# Patient Record
Sex: MALE | Race: WHITE | HISPANIC OR LATINO | Employment: UNEMPLOYED | URBAN - METROPOLITAN AREA
[De-identification: names, ages, dates, MRNs, and addresses within clinical notes are randomized per-mention and may not be internally consistent; named-entity substitution may affect disease eponyms.]

---

## 2020-01-07 ENCOUNTER — HOSPITAL ENCOUNTER (EMERGENCY)
Facility: MEDICAL CENTER | Age: 55
End: 2020-01-07
Attending: EMERGENCY MEDICINE
Payer: OTHER MISCELLANEOUS

## 2020-01-07 ENCOUNTER — APPOINTMENT (OUTPATIENT)
Dept: RADIOLOGY | Facility: MEDICAL CENTER | Age: 55
End: 2020-01-07
Attending: STUDENT IN AN ORGANIZED HEALTH CARE EDUCATION/TRAINING PROGRAM
Payer: OTHER MISCELLANEOUS

## 2020-01-07 ENCOUNTER — APPOINTMENT (OUTPATIENT)
Dept: RADIOLOGY | Facility: MEDICAL CENTER | Age: 55
End: 2020-01-07
Attending: EMERGENCY MEDICINE
Payer: OTHER MISCELLANEOUS

## 2020-01-07 VITALS
OXYGEN SATURATION: 98 % | BODY MASS INDEX: 32.43 KG/M2 | HEART RATE: 82 BPM | HEIGHT: 65 IN | WEIGHT: 194.67 LBS | RESPIRATION RATE: 16 BRPM | DIASTOLIC BLOOD PRESSURE: 65 MMHG | TEMPERATURE: 97.5 F | SYSTOLIC BLOOD PRESSURE: 121 MMHG

## 2020-01-07 DIAGNOSIS — M54.50 LUMBAR BACK PAIN: ICD-10-CM

## 2020-01-07 DIAGNOSIS — M54.16 LUMBAR RADICULOPATHY: ICD-10-CM

## 2020-01-07 LAB
ALBUMIN SERPL BCP-MCNC: 4.2 G/DL (ref 3.2–4.9)
ALBUMIN/GLOB SERPL: 1.3 G/DL
ALP SERPL-CCNC: 71 U/L (ref 30–99)
ALT SERPL-CCNC: 17 U/L (ref 2–50)
ANION GAP SERPL CALC-SCNC: 11 MMOL/L (ref 0–11.9)
AST SERPL-CCNC: 16 U/L (ref 12–45)
BASOPHILS # BLD AUTO: 0.4 % (ref 0–1.8)
BASOPHILS # BLD: 0.03 K/UL (ref 0–0.12)
BILIRUB SERPL-MCNC: 0.9 MG/DL (ref 0.1–1.5)
BUN SERPL-MCNC: 40 MG/DL (ref 8–22)
CALCIUM SERPL-MCNC: 9.2 MG/DL (ref 8.5–10.5)
CHLORIDE SERPL-SCNC: 106 MMOL/L (ref 96–112)
CO2 SERPL-SCNC: 22 MMOL/L (ref 20–33)
CREAT SERPL-MCNC: 1.09 MG/DL (ref 0.5–1.4)
EOSINOPHIL # BLD AUTO: 0.08 K/UL (ref 0–0.51)
EOSINOPHIL NFR BLD: 1 % (ref 0–6.9)
ERYTHROCYTE [DISTWIDTH] IN BLOOD BY AUTOMATED COUNT: 45.9 FL (ref 35.9–50)
GLOBULIN SER CALC-MCNC: 3.3 G/DL (ref 1.9–3.5)
GLUCOSE SERPL-MCNC: 135 MG/DL (ref 65–99)
HCT VFR BLD AUTO: 40.1 % (ref 42–52)
HGB BLD-MCNC: 13.4 G/DL (ref 14–18)
IMM GRANULOCYTES # BLD AUTO: 0.02 K/UL (ref 0–0.11)
IMM GRANULOCYTES NFR BLD AUTO: 0.3 % (ref 0–0.9)
LYMPHOCYTES # BLD AUTO: 1.49 K/UL (ref 1–4.8)
LYMPHOCYTES NFR BLD: 19.4 % (ref 22–41)
MCH RBC QN AUTO: 32.1 PG (ref 27–33)
MCHC RBC AUTO-ENTMCNC: 33.4 G/DL (ref 33.7–35.3)
MCV RBC AUTO: 96.2 FL (ref 81.4–97.8)
MONOCYTES # BLD AUTO: 0.62 K/UL (ref 0–0.85)
MONOCYTES NFR BLD AUTO: 8.1 % (ref 0–13.4)
NEUTROPHILS # BLD AUTO: 5.44 K/UL (ref 1.82–7.42)
NEUTROPHILS NFR BLD: 70.8 % (ref 44–72)
NRBC # BLD AUTO: 0 K/UL
NRBC BLD-RTO: 0 /100 WBC
PLATELET # BLD AUTO: 184 K/UL (ref 164–446)
PMV BLD AUTO: 11.4 FL (ref 9–12.9)
POTASSIUM SERPL-SCNC: 3.9 MMOL/L (ref 3.6–5.5)
PROT SERPL-MCNC: 7.5 G/DL (ref 6–8.2)
RBC # BLD AUTO: 4.17 M/UL (ref 4.7–6.1)
SODIUM SERPL-SCNC: 139 MMOL/L (ref 135–145)
WBC # BLD AUTO: 7.7 K/UL (ref 4.8–10.8)

## 2020-01-07 PROCEDURE — A9576 INJ PROHANCE MULTIPACK: HCPCS | Performed by: EMERGENCY MEDICINE

## 2020-01-07 PROCEDURE — 700111 HCHG RX REV CODE 636 W/ 250 OVERRIDE (IP): Performed by: EMERGENCY MEDICINE

## 2020-01-07 PROCEDURE — 85025 COMPLETE CBC W/AUTO DIFF WBC: CPT

## 2020-01-07 PROCEDURE — 700117 HCHG RX CONTRAST REV CODE 255: Performed by: EMERGENCY MEDICINE

## 2020-01-07 PROCEDURE — 99285 EMERGENCY DEPT VISIT HI MDM: CPT

## 2020-01-07 PROCEDURE — 72158 MRI LUMBAR SPINE W/O & W/DYE: CPT

## 2020-01-07 PROCEDURE — 80053 COMPREHEN METABOLIC PANEL: CPT

## 2020-01-07 PROCEDURE — 700102 HCHG RX REV CODE 250 W/ 637 OVERRIDE(OP): Performed by: EMERGENCY MEDICINE

## 2020-01-07 PROCEDURE — 96374 THER/PROPH/DIAG INJ IV PUSH: CPT

## 2020-01-07 PROCEDURE — A9270 NON-COVERED ITEM OR SERVICE: HCPCS | Performed by: EMERGENCY MEDICINE

## 2020-01-07 RX ORDER — PREGABALIN 25 MG/1
25 CAPSULE ORAL 3 TIMES DAILY
COMMUNITY
End: 2020-01-07 | Stop reason: SDUPTHER

## 2020-01-07 RX ORDER — HYDROCODONE BITARTRATE AND ACETAMINOPHEN 5; 325 MG/1; MG/1
1-2 TABLET ORAL EVERY 4 HOURS PRN
COMMUNITY
End: 2020-01-09

## 2020-01-07 RX ORDER — KETOROLAC TROMETHAMINE 30 MG/ML
15 INJECTION, SOLUTION INTRAMUSCULAR; INTRAVENOUS ONCE
Status: COMPLETED | OUTPATIENT
Start: 2020-01-07 | End: 2020-01-07

## 2020-01-07 RX ORDER — HYDROCODONE BITARTRATE AND ACETAMINOPHEN 5; 325 MG/1; MG/1
1 TABLET ORAL ONCE
Status: COMPLETED | OUTPATIENT
Start: 2020-01-07 | End: 2020-01-07

## 2020-01-07 RX ORDER — PREGABALIN 25 MG/1
25 CAPSULE ORAL 3 TIMES DAILY
Qty: 45 CAP | Refills: 0 | Status: SHIPPED | OUTPATIENT
Start: 2020-01-07 | End: 2020-01-22

## 2020-01-07 RX ORDER — FAMOTIDINE 20 MG/1
20 TABLET, FILM COATED ORAL 2 TIMES DAILY
COMMUNITY

## 2020-01-07 RX ADMIN — GADOTERIDOL 20 ML: 279.3 INJECTION, SOLUTION INTRAVENOUS at 09:43

## 2020-01-07 RX ADMIN — HYDROCODONE BITARTRATE AND ACETAMINOPHEN 1 TABLET: 5; 325 TABLET ORAL at 08:12

## 2020-01-07 RX ADMIN — KETOROLAC TROMETHAMINE 15 MG: 30 INJECTION, SOLUTION INTRAMUSCULAR at 09:59

## 2020-01-07 SDOH — HEALTH STABILITY: MENTAL HEALTH: HOW OFTEN DO YOU HAVE A DRINK CONTAINING ALCOHOL?: NEVER

## 2020-01-07 ASSESSMENT — LIFESTYLE VARIABLES
DOES PATIENT WANT TO STOP DRINKING: NO
DO YOU DRINK ALCOHOL: NO
HAVE YOU EVER FELT YOU SHOULD CUT DOWN ON YOUR DRINKING: NO
TOTAL SCORE: 0
EVER HAD A DRINK FIRST THING IN THE MORNING TO STEADY YOUR NERVES TO GET RID OF A HANGOVER: NO
EVER FELT BAD OR GUILTY ABOUT YOUR DRINKING: NO
CONSUMPTION TOTAL: INCOMPLETE
TOTAL SCORE: 0
HAVE PEOPLE ANNOYED YOU BY CRITICIZING YOUR DRINKING: NO
TOTAL SCORE: 0

## 2020-01-07 ASSESSMENT — PAIN DESCRIPTION - DESCRIPTORS: DESCRIPTORS: DULL

## 2020-01-07 NOTE — ED NOTES
Report received, assuming care.  Pt medicated as ordered for 5/10 back pain.  Awaiting to go to MRI.  MRI screening completed using the  ipad.

## 2020-01-07 NOTE — ED NOTES
Assessment includes a neuro assessment as WDL, abnormal statements made in waiting room per ABBI Perez technician, no abnormal statements made to myself, ALY Munoz, at this time including assessment.

## 2020-01-07 NOTE — ED NOTES
Pt up for discharge.  Instructions given via  ipad.  Pt verbalized understanding, signed.  Requesting a cab voucher.  Bus pass provided.  Pt leaves ambulatory, steady gait, no distress.

## 2020-01-07 NOTE — ED TRIAGE NOTES
Miguel Cain  54 y.o.  male  Chief Complaint   Patient presents with   • Low Back Pain     brought in by tevin picked up from [facility] 6. c/o low back pain ( chronic )  radiates to both legs x years. patient Romansh speaking only.

## 2020-01-07 NOTE — ED PROVIDER NOTES
ED Provider Note       service used to assist with this encounter      ER PROVIDER NOTE      CHIEF COMPLAINT  Chief Complaint   Patient presents with   • Low Back Pain     brought in by remsa picked up from [facility] 6. c/o low back pain ( chronic )  radiates to both legs x years. patient English speaking only.        HPI  Miguel Cain is a 54 y.o. male with past medical history of untreated diabetes, high blood pressure who presents to the emergency department complaining of worsening low back pain and leg weakness.  The pain is in the lumbar area and radiates into the inguinal region and down the legs.  Patient states that he has a long history of back pain with associated numbness in the legs for years, that he has had 3 surgeries in the back, but yesterday when he was trying to run, he felt acute worsening of his low back pain.  Patient reports that he has difficulty with balance and frequently falls because of his leg numbness and weakness.  He reports that he is diabetic but has been off of his insulin for the last 4 years because his medications were left in [place] and he since moved to [place] and then came to [place].  He has been in [place] for the last 2 days.  Patient denies numbness in the groin, loss of bowel or bladder function.  He denies chest pain, abdominal pain, headaches, fevers.          REVIEW OF SYSTEMS  Pertinent positives include numbness and weakness of bilateral legs. Pertinent negatives include no numbness in the groin, loss of bowel or bladder function. See HPI for details. All other systems reviewed and are negative.    PAST MEDICAL HISTORY   has a past medical history of Chronic back pain, HTN (hypertension), Neuropathy (HCC), and Peptic ulcer.   Diabetes    SURGICAL HISTORY  3 spinal surgeries      FAMILY HISTORY  History reviewed. No pertinent family history.    SOCIAL HISTORY  Patient is an every day smoker  He denies alcohol use  He denies substance use     Social History  "    Substance and Sexual Activity   Drug Use Never       CURRENT MEDICATIONS  Home Medications     Reviewed by Blake Gao R.N. (Registered Nurse) on 01/07/20 at 0237  Med List Status: Complete   Medication Last Dose Status   famotidine (PEPCID) 20 MG Tab 1/6/2020 Active   HYDROcodone-acetaminophen (NORCO) 5-325 MG Tab per tablet 1/6/2020 Active   pregabalin (LYRICA) 25 MG Cap  Active                ALLERGIES  Allergies   Allergen Reactions   • Ibuprofen    • Tylenol        PHYSICAL EXAM  VITAL SIGNS: /84   Pulse 100   Temp 36.4 °C (97.5 °F) (Oral)   Resp 16   Ht 1.651 m (5' 5\")   Wt 88.3 kg (194 lb 10.7 oz)   SpO2 98%   BMI 32.39 kg/m²   Pulse ox interpretation: I interpret this pulse ox as normal.    Constitutional: Alert in no apparent distress.  HENT: No signs of trauma, Bilateral external ears normal, Nose normal.   Eyes: Pupils are equal and reactive, Conjunctiva normal, Non-icteric.   Neck: Normal range of motion, No tenderness, Supple, No stridor.   Lymphatic: No lymphadenopathy noted.   Cardiovascular: Regular rate and rhythm, no murmurs.   Thorax & Lungs: Normal breath sounds, No respiratory distress, No wheezing, No chest tenderness.   Abdomen: Bowel sounds normal, Soft, No tenderness, No masses, No pulsatile masses. No peritoneal signs.  Skin: Warm, Dry, No erythema, No rash.   Back: Tenderness over the lower lumbar vertebra, bilateral hips.  Paravertebral tenderness also present. No bony tenderness in the thoracic or cervical spine.  Negative straight leg raise.  Extremities: Intact distal pulses, No edema, No tenderness, No cyanosis,  Neurologic: Alert, strength full and symmetric in the upper extremities, 4 out of 5 strength in bilateral lower extremities with reported decreased sensation otherwise no focal deficits noted  Psychiatric: Affect normal, Mood normal.     DIAGNOSTIC STUDIES / PROCEDURES      LABS  Results for orders placed or performed during the hospital encounter of " 01/07/20   CBC WITH DIFFERENTIAL   Result Value Ref Range    WBC 7.7 4.8 - 10.8 K/uL    RBC 4.17 (L) 4.70 - 6.10 M/uL    Hemoglobin 13.4 (L) 14.0 - 18.0 g/dL    Hematocrit 40.1 (L) 42.0 - 52.0 %    MCV 96.2 81.4 - 97.8 fL    MCH 32.1 27.0 - 33.0 pg    MCHC 33.4 (L) 33.7 - 35.3 g/dL    RDW 45.9 35.9 - 50.0 fL    Platelet Count 184 164 - 446 K/uL    MPV 11.4 9.0 - 12.9 fL    Neutrophils-Polys 70.80 44.00 - 72.00 %    Lymphocytes 19.40 (L) 22.00 - 41.00 %    Monocytes 8.10 0.00 - 13.40 %    Eosinophils 1.00 0.00 - 6.90 %    Basophils 0.40 0.00 - 1.80 %    Immature Granulocytes 0.30 0.00 - 0.90 %    Nucleated RBC 0.00 /100 WBC    Neutrophils (Absolute) 5.44 1.82 - 7.42 K/uL    Lymphs (Absolute) 1.49 1.00 - 4.80 K/uL    Monos (Absolute) 0.62 0.00 - 0.85 K/uL    Eos (Absolute) 0.08 0.00 - 0.51 K/uL    Baso (Absolute) 0.03 0.00 - 0.12 K/uL    Immature Granulocytes (abs) 0.02 0.00 - 0.11 K/uL    NRBC (Absolute) 0.00 K/uL   COMP METABOLIC PANEL   Result Value Ref Range    Sodium 139 135 - 145 mmol/L    Potassium 3.9 3.6 - 5.5 mmol/L    Chloride 106 96 - 112 mmol/L    Co2 22 20 - 33 mmol/L    Anion Gap 11.0 0.0 - 11.9    Glucose 135 (H) 65 - 99 mg/dL    Bun 40 (H) 8 - 22 mg/dL    Creatinine 1.09 0.50 - 1.40 mg/dL    Calcium 9.2 8.5 - 10.5 mg/dL    AST(SGOT) 16 12 - 45 U/L    ALT(SGPT) 17 2 - 50 U/L    Alkaline Phosphatase 71 30 - 99 U/L    Total Bilirubin 0.9 0.1 - 1.5 mg/dL    Albumin 4.2 3.2 - 4.9 g/dL    Total Protein 7.5 6.0 - 8.2 g/dL    Globulin 3.3 1.9 - 3.5 g/dL    A-G Ratio 1.3 g/dL   ESTIMATED GFR   Result Value Ref Range    GFR If African American >60 >60 mL/min/1.73 m 2    GFR If Non African American >60 >60 mL/min/1.73 m 2       All labs reviewed by me.    RADIOLOGY  MR-LUMBAR SPINE-WITH & W/O   Final Result      1.  Transitional lumbosacral anatomy as detailed above.   2.  Postsurgical changes L5-S1 posterior spinal fusion laminectomies.   3.  Mild multilevel disc and facet degeneration as detailed. L2-L3 mild  spinal stenosis.   4.  Foraminal narrowing as detailed, at least moderate left L4-L5.           The radiologist's interpretation of all radiological studies have been reviewed by me.    COURSE & MEDICAL DECISION MAKING  Nursing notes, VS, PMSFHx reviewed in chart.    6:07 AM Patient seen and examined at bedside. Patient will be treated with Norco 5/325. Ordered for MRI lumbar spine, CBC, CMP to evaluate his symptoms.     10:35 AM reviewed MRI results.  Discussed results with patient and informed him that there are no acute changes on his MRI.  His symptoms are somewhat improved by the pain medication.  Discussed that he should see his doctor upon returning to [place].  Patient is requesting a supply of Lyrica since he left his prescriptions in [place].    Decision Making:  This is a 54 y.o. male with history of untreated diabetes presenting with acute on chronic low back pain with radiculopathy.  He has bony tenderness underneath his surgical scar in the lumbar area.  He reports leg weakness, numbness, frequent falls.  He has weakness on exam in the lower legs.  He is afebrile and has no history of malignancy.  Differential diagnosis includes but is not limited to cauda equina syndrome, spinal stenosis, lumbago with radiculopathy, displaced surgical hardware.  His MRI is negative for acute fracture, significant spinal cord compression or other acute findings.  Patient is requesting a supply of Lyrica since he has left his prescription in [place].  Discussed with patient that I do not feel his symptoms will best be managed by a narcotic pain medication, but I will agree to giving him a temporary supply of Lyrica until he can see his physician.  Therefore the patient will be discharged in a stable condition with instructions to follow-up with his primary care physician.    The patient will not drink alcohol nor drive with prescribed medications. The patient will return for new or worsening symptoms and is  stable at the time of discharge. Patient was given return precautions. Patient and/or family member verbalizes understanding and will comply.    DISPOSITION:  Patient will be discharged home in stable condition.    FOLLOW UP:  No follow-up provider specified.   Your doctor    OUTPATIENT MEDICATIONS:  Current Outpatient Medications   Medication Sig Dispense Refill   • HYDROcodone-acetaminophen (NORCO) 5-325 MG Tab per tablet Take 1-2 Tabs by mouth every four hours as needed.     • famotidine (PEPCID) 20 MG Tab Take 20 mg by mouth 2 times a day.     • pregabalin (LYRICA) 25 MG Cap Take 1 Cap by mouth 3 times a day for 15 days. 45 Cap 0           FINAL IMPRESSION  1. Lumbar back pain    2. Lumbar radiculopathy

## 2020-01-09 ENCOUNTER — HOSPITAL ENCOUNTER (EMERGENCY)
Facility: MEDICAL CENTER | Age: 55
End: 2020-01-09
Attending: EMERGENCY MEDICINE
Payer: MEDICARE

## 2020-01-09 ENCOUNTER — APPOINTMENT (OUTPATIENT)
Dept: RADIOLOGY | Facility: MEDICAL CENTER | Age: 55
End: 2020-01-09
Attending: EMERGENCY MEDICINE
Payer: MEDICARE

## 2020-01-09 VITALS
HEIGHT: 65 IN | DIASTOLIC BLOOD PRESSURE: 88 MMHG | HEART RATE: 86 BPM | BODY MASS INDEX: 31.4 KG/M2 | WEIGHT: 188.49 LBS | SYSTOLIC BLOOD PRESSURE: 132 MMHG | OXYGEN SATURATION: 96 % | RESPIRATION RATE: 16 BRPM | TEMPERATURE: 98.6 F

## 2020-01-09 DIAGNOSIS — M54.50 CHRONIC BILATERAL LOW BACK PAIN WITHOUT SCIATICA: ICD-10-CM

## 2020-01-09 DIAGNOSIS — G89.29 CHRONIC BILATERAL LOW BACK PAIN WITHOUT SCIATICA: ICD-10-CM

## 2020-01-09 DIAGNOSIS — R51.9 NONINTRACTABLE HEADACHE, UNSPECIFIED CHRONICITY PATTERN, UNSPECIFIED HEADACHE TYPE: ICD-10-CM

## 2020-01-09 PROCEDURE — 99284 EMERGENCY DEPT VISIT MOD MDM: CPT

## 2020-01-09 PROCEDURE — 700102 HCHG RX REV CODE 250 W/ 637 OVERRIDE(OP): Performed by: EMERGENCY MEDICINE

## 2020-01-09 PROCEDURE — A9270 NON-COVERED ITEM OR SERVICE: HCPCS | Performed by: EMERGENCY MEDICINE

## 2020-01-09 PROCEDURE — 70450 CT HEAD/BRAIN W/O DYE: CPT

## 2020-01-09 RX ORDER — HYDROCODONE BITARTRATE AND ACETAMINOPHEN 5; 325 MG/1; MG/1
1 TABLET ORAL EVERY 4 HOURS PRN
Qty: 15 TAB | Refills: 0 | Status: SHIPPED | OUTPATIENT
Start: 2020-01-09 | End: 2020-01-13

## 2020-01-09 RX ORDER — HYDROCODONE BITARTRATE AND ACETAMINOPHEN 5; 325 MG/1; MG/1
1 TABLET ORAL ONCE
Status: COMPLETED | OUTPATIENT
Start: 2020-01-09 | End: 2020-01-09

## 2020-01-09 RX ADMIN — HYDROCODONE BITARTRATE AND ACETAMINOPHEN 1 TABLET: 5; 325 TABLET ORAL at 09:22

## 2020-01-09 NOTE — ED NOTES
Pt. Discharged at this time,  # [id number] utilized, pt. Denies questions, pt. Provided with cab voucher and prescription for pain medication. Pt. Ambulated to ED entrance independently with a steady gait.

## 2020-01-09 NOTE — DISCHARGE INSTRUCTIONS
"Dolor de shaun, preguntas frecuentes y monica respuestas  (Headaches, Frequently Asked Questions)  CEFALEAS MIGRAÑOSAS  P: ¿Qué es la migraña? ¿Qué la ocasiona? ¿Cómo puedo tratarla?  R: En general, la migraña comienza duane un dolor apagado. Luego progresa hacia un dolor, sue, punzante y duane un latido. Sentirá monse dolor en las sienes. Podrá sentir dolor en la parte anterior o posterior de la shaun, o en beti o ambos lados. El dolor suele estar acompañado de silvino combinación de:  · Náuseas.   · Vómitos.   · Sensibilidad a la giovanni y los ruidos.   Algunas personas (un 15%) experimentan un aura (femi abajo) antes de un ataque. La causa de la migraña se debe a reacciones químicas del cerebro. El tratamiento para la migraña puede incluir medicamentos de venta ailin. También puede incluir técnicas de autoayuda. Estas incluyen entrenamientos para la relajación y biorretroalimentación.   P: ¿Qué es un aura?  R: Alrededor del 15% de las personas con migraña tiene un \"aura\". Es silvino señal de síntomas neurológicos que ocurren antes de un dolor de shaun por migrañas. Podrá femi líneas onduladas o irregulares, puntos o luces parpadeantes. Podrá experimentar visión de túnel o puntos ciegos en beti o ambos ojos. El aura puede incluir alucinaciones visuales o auditivas (algo que se imagina). Puede incluir trastornos en el olfato (duane olores extraños), el tacto o el gusto. Entre otros síntomas se incluyen:  · Adormecimiento.   · Sensación de hormigueo.   · Dificultad para recordar o decir la palabra correcta.   Estos episodios neurológicos pueden durar hasta 60 minutos. Los síntomas desaparecerán a medida que el dolor de shaun comience.  P:¿Qué es un disparador?  R: Ciertos factores físicos o ambientales pueden llevar a \"disparar\" silvino migraña. Estos son:  · Alimentos.   · Cambios hormonales.   · Clima.   · Estrés.   Es importante recordar que los disparadores son diferentes entre si. Para ayudar a prevenir ataques de migrañas, " "necesitará descubrir cuáles son los disparadores que le afectan. Lleve un diario sobre monica ike de shaun. Lesvia es un buen modo para descubrir los disparadores. El diario le ayudará en el momento de hablar con el profesional acerca de proctor enfermedad.  P: ¿El clima afecta en las migrañas?  R: La giovanni solar, el calor, la humedad y lo cambios drásticos en la presión barométrica pueden llevar a, o \"disparar\" un ataque de migraña en algunas personas. Mary estudios valladares demostrado que el clima no actúa duane disparador para todas las personas con migraña.  P: ¿Cuál es la relación entre la migraña y la hormonas?  R: Las hormonas inician y regulan muchas de las funciones corporales. Las hormonas mantienen el balance en el cuerpo dentro de los constantes cambios de ambiente. Algunas veces, el nivel de hormonas en el cuerpo se desbalancea. Por ejemplo, poncho la menstruación, el embarazo o la menopausia. Pueden ser la causa de un ataque de migraña. De hecho, alrededor de abdoul cuartos de las mujeres con migraña informan que monica ataques están relacionados con el ciclo menstrual.   P: ¿Aumenta el riesgo de sufrir un choque cardíaco en las personas que padecen migraña?  R: La probabilidad de que un ataque de migraña ocasione un ataque cardíaco es muy remota. [place] no quiere decir que silvino persona que sufre de migraña no pueda tener un ataque cardíaco asociado con roselyn. En las personas menores de 40 años, el factor más común para un ataque es la migraña. Mary poncho la brian de silvino persona, la ocurrencia de un dolor de shaun por migraña está asociada con silvino reducción en el riesgo de morir por un ataque cerebrovascular.   P: ¿Cuáles son los medicamentos para la migraña?  R: La medicación precisa se utiliza para tratar el dolor de shaun silvino vez que ha comenzado. Son ejemplos, medicamentos de venta ailin, desinflamatorios sin esteroides, ergotamínicos y triptanos.   P: ¿Qué son los triptanos?  R: Lo triptanos son silvino nueva clase de " "medicamentos abortivos. Son específicos para tratar monse problema. Los triptanos son vasoconstrictores. Moderan algunas reacciones químicas del cerebro. Los triptanos trabajan duane receptores del cerebro. Ayudan a restaurar el balance de un neurotransmisor denominado serotonina. Se erma que las fluctuaciones en los niveles de serotonina son la causa principal de la migraña.   P: ¿Son efectivos los medicamentos de venta ailin para la migraña?  R: Los medicamentos de venta ailin pueden ser efectivos para aliviar ike leves a moderados y los síntomas asociados a la migraña. Mary deberá consultar a un médico antes de comenzar cualquier tratamiento para la migraña.   P: ¿Cuáles son los medicamentos de prevención de la migraña?  R: Se suele denominar tratamiento \"profiláctico\" a los medicamentos para la prevención de la migraña. Se utilizan para reducir la frecuencia, gravedad y duración de los ataques de migraña. Son ejemplos de medicamentos de prevención: antiepilépticos, antidepresivos, bloqueadores beta, bloqueadores de los bowles de calcio y medicamentos antiinflamatorios sin esteroides.  P: ¿ Por qué se utilizan anticonvulsivantes para tratar la migraña?  R: Rhys los últimos años, ha habido un creciente interés en las drogas antiepilépticas para la prevención de la migraña. A menudo se los conoce duane \"anticonvulsivantes\". La epilepsia y la migraña suceden por reacciones similares en el cerebro.   P: ¿ Por qué se utilizan antidepresivos para tratar la migraña?  R: Los antidepresivos típicamente se utilizan para tratar a las personas con depresión. Pueden reducir la frecuencia de la migraña a través de la regulación de los niveles químicos, duane la serotonina, en el cerebro.   P: ¿ Por qué se utilizan terapias alternativas para tratar la migraña?  R: El término \"terapias alternativas\" suelen utilizarse para describir los tratamientos que se considera que están por fuera de alcance la medicina occidental " "convencional. Son ejemplos de las terapias alternativas: la acupuntura, la acupresión y el yoga. Otra terapia alternativa común es la terapia herbal. Se erma que algunas hierbas ayudan a aliviar los ike de rob. Siempre consulte con el profesional acerca de las terapias alternativas antes de utilizarlas. Algunos productos herbales contienen arsénico y otras toxinas.  IKE DE ROB POR TENSIÓN  P: ¿Qué es un dolor de rob por tensión? ¿Qué lo ocasiona? ¿Cómo puedo tratarlo?  R: Los ike de rob por tensión ocurren al anel. A menudo son el resultado de estrés temporario, ansiedad, fatiga o barbara. Los síntomas incluyen dolor en las sienes, silvino sensación duane de tener silvino david alrededor de la rob (un dolor que \"presiona\"). Los síntomas pueden incluir silvino sensación de empuje, de presión y contracción de los músculos de la rob y el lizbeth. El dolor comienza en la frente, sienes o en la parte posterior de la rob y el lizbeth. El tratamiento para los ike de rob por tensión puede incluir medicamentos de venta ailin. También puede incluir técnicas de autoayuda con entrenamientos para la relajación y biorretroalimentación.  CEFALEA EN RACIMOS  P: ¿Qué es silvino cefalea en racimos? ¿Qué la ocasiona? ¿Cómo puedo tratarla?  R: La cefalea en racimos ezekiel proctor nombre debido a que los ataques vienen en grupos. El dolor aparece con poco o ningún aviso. Normalmente ocurre de un lado de la rob. Muchas veces el dolor viene acompañado de un lagrimeo u mitchel laguna y goteo de la nariz del mismo lado que el dolor. Se erma que la causa es silvino reacción en las sustancias químicas del cerebro. Se describe duane el amy más grave e intenso de cualquier tipo de dolor de rob. El tratamiento incluye medicamentos bajo receta y oxígeno.  CEFALEA SINUSAL  P: ¿Qué es silvino cefalea sinusal? ¿Qué la ocasiona? ¿Cómo puedo tratarla?  R: Cuando se inflama silvino cavidad en los huesos de la maryan y el cráneo (sinus) ocasiona un dolor " "localizado. Esta enfermedad generalmente es el resultado de silvino reacción alérgica, un tumor o silvino infección. Si el dolor de rob está ocasionado por un bloqueo del sinus, duane silvino infección, probablemente tendrá fiebre. Silvino imagen de anival X confirmará el bloqueo del sinus. El tratamiento indicado por el médico podrá incluir antibióticos para la infección, y también antihistamínicos o descongestivos.   DOLOR DE ROB POR EFECTO \"REBOTE\"  P: ¿Qué es un dolor de rob por efecto \"rebote\"? ¿Qué lo ocasiona? ¿Cómo puedo tratarlo?  R: Si se ahmet medicamentos para el dolor de rob muy a menudo puede llevar a la enfermedad conocida duane \"dolor de rob por rebote\". Un patrón de abuso de medicamentos para el dolor de rob supone tomarlos más de dos veces por semana o en cantidades excesivas. [place] significa más que lo que indica el envase o el médico. Con los ike de rob por rebote, los medicamentos no sólo sage de aliviar el dolor sino que además comienzan a ocasionar ike de rob. Los médicos tratan los ike de rob por rebote mediante la disminución del medicamento del que se ha abusado. A veces el medico podrá sustituir gradualmente por un tipo diferente de tratamiento o medicación. Dejar de consumirlo podría ser difícil. El abuso regular de un medicamento aumenta el potencial que se produzcan efectos secundarios graves. Consulte con un médico si utiliza regularmente medicamentos para el dolor de rob más de dos días por semana o más de lo que indica el envase.  PREGUNTAS Y RESPUESTAS ADICIONALES  P: ¿Qué es la biorretroalimentación?  R: La biorretroalimentación es un tratamiento de autoayuda. La biorretroalimentación utiliza un equipamiento especial para controlar los movimientos involuntarios del cuerpo y las respuestas físicas. La biorretroalimentación controla:  · Respiración.   · Pulso.   · Latidos cardíacos.   · Temperatura.   · Tensión muscular.   · Actividad cerebrales.   La " biorretroalimentación le ayudará a mejorar y perfeccionar monica ejercicios de relajación. Aprenderá a controlar las respuestas físicas relacionadas con el estrés. Silvino vez que se dominan las técnicas no necesitará más el equipamiento.  P: ¿Son hereditarios los ike de shaun?  R: Según algunas estimaciones, aproximadamente 28 millones de estadounidenses sufren migraña. Cuatro de cada arleth (80%) informan silvino historia familiar de migraña. Los investigadores no pueden asegurar si se trata de un problema genético o silvino predisposición familiar. A pesar de esto, un donita tiene 50% de probabilidades de sufrir migraña si beti de monica padres la sufre. El donita tiene un 75% de probabilidades si ambos padres la sufren.   P. ¿Puede un doniat tener migraña?  R: En el momento de ingresar a la escuela secundaria, la mayoría de los jóvenes valladares experimentado algún tipo de cefalea. Algunos abordajes o medicamentos seguros y efectivos pueden evitar las cefaleas o detenerlas luego de que valladares comenzado.   P. ¿Qué tipo de especialista debe femi para diagnosticar y tratar silvino cefalea?  R: Comience con proctor médico de cabecera. [place] acerca de proctor experiencia y abordaje de las cefaleas. Comente los métodos de clasificación, diagnóstico y tratamiento. El profesional decidirá si lo derivará a un especialista, según los síntomas u otras enfermedades. El hecho de sufrir diabetes, alergias, etc, puede requerir un abordaje más complejo. La National Headache Foundation (Fundación Nacional para las Cefaleas) proporcionará, a pedido, silvino lista de los médicos que son miembros de proctor estado.  Document Released: 11/30/2009 Document Revised: 03/11/2013  [employer]® Patient Information ©2013 [employer], [employer].Headaches, Frequently Asked Questions  MIGRAINE HEADACHES  Q: What is migraine? What causes it? How can I treat it?  A: Generally, migraine headaches begin as a dull ache. Then they develop into a constant, throbbing, and pulsating pain. You may experience pain  "at the temples. You may experience pain at the front or back of one or both sides of the head. The pain is usually accompanied by a combination of:  · Nausea.   · Vomiting.   · Sensitivity to light and noise.   Some people (about 15%) experience an aura (see below) before an attack. The cause of migraine is believed to be chemical reactions in the brain. Treatment for migraine may include over-the-counter or prescription medications. It may also include self-help techniques. These include relaxation training and biofeedback.   Q: What is an aura?  A: About 15% of people with migraine get an \"aura\". This is a sign of neurological symptoms that occur before a migraine headache. You may see wavy or jagged lines, dots, or flashing lights. You might experience tunnel vision or blind spots in one or both eyes. The aura can include visual or auditory hallucinations (something imagined). It may include disruptions in smell (such as strange odors), taste or touch. Other symptoms include:  · Numbness.   · A \"pins and needles\" sensation.   · Difficulty in recalling or speaking the correct word.   These neurological events may last as long as 60 minutes. These symptoms will fade as the headache begins.  Q: What is a trigger?  A: Certain physical or environmental factors can lead to or \"trigger\" a migraine. These include:  · Foods.   · Hormonal changes.   · Weather.   · Stress.   It is important to remember that triggers are different for everyone. To help prevent migraine attacks, you need to figure out which triggers affect you. Keep a headache diary. This is a good way to track triggers. The diary will help you talk to your healthcare professional about your condition.  Q: Does weather affect migraines?  A: Bright sunshine, hot, humid conditions, and drastic changes in barometric pressure may lead to, or \"trigger,\" a migraine attack in some people. But studies have shown that weather does not act as a trigger for everyone " "with migraines.  Q: What is the link between migraine and hormones?  A: Hormones start and regulate many of your body's functions. Hormones keep your body in balance within a constantly changing environment. The levels of hormones in your body are unbalanced at times. Examples are during menstruation, pregnancy, or menopause. That can lead to a migraine attack. In fact, about three quarters of all women with migraine report that their attacks are related to the menstrual cycle.   Q: Is there an increased risk of stroke for migraine sufferers?  A: The likelihood of a migraine attack causing a stroke is very remote. That is not to say that migraine sufferers cannot have a stroke associated with their migraines. In persons under age 40, the most common associated factor for stroke is migraine headache. But over the course of a person's normal life span, the occurrence of migraine headache may actually be associated with a reduced risk of dying from cerebrovascular disease due to stroke.   Q: What are acute medications for migraine?  A: Acute medications are used to treat the pain of the headache after it has started. Examples over-the-counter medications, NSAIDs, ergots, and triptans.   Q: What are the triptans?  A: Triptans are the newest class of abortive medications. They are specifically targeted to treat migraine. Triptans are vasoconstrictors. They moderate some chemical reactions in the brain. The triptans work on receptors in your brain. Triptans help to restore the balance of a neurotransmitter called serotonin. Fluctuations in levels of serotonin are thought to be a main cause of migraine.   Q: Are over-the-counter medications for migraine effective?  A: Over-the-counter, or \"OTC,\" medications may be effective in relieving mild to moderate pain and associated symptoms of migraine. But you should see your caregiver before beginning any treatment regimen for migraine.   Q: What are preventive medications for " "migraine?  A: Preventive medications for migraine are sometimes referred to as \"prophylactic\" treatments. They are used to reduce the frequency, severity, and length of migraine attacks. Examples of preventive medications include antiepileptic medications, antidepressants, beta-blockers, calcium channel blockers, and NSAIDs (nonsteroidal anti-inflammatory drugs).  Q: Why are anticonvulsants used to treat migraine?  A: During the past few years, there has been an increased interest in antiepileptic drugs for the prevention of migraine. They are sometimes referred to as \"anticonvulsants\". Both epilepsy and migraine may be caused by similar reactions in the brain.   Q: Why are antidepressants used to treat migraine?  A: Antidepressants are typically used to treat people with depression. They may reduce migraine frequency by regulating chemical levels, such as serotonin, in the brain.   Q: What alternative therapies are used to treat migraine?  A: The term \"alternative therapies\" is often used to describe treatments considered outside the scope of conventional Western medicine. Examples of alternative therapy include acupuncture, acupressure, and yoga. Another common alternative treatment is herbal therapy. Some herbs are believed to relieve headache pain. Always discuss alternative therapies with your caregiver before proceeding. Some herbal products contain arsenic and other toxins.  TENSION HEADACHES  Q: What is a tension-type headache? What causes it? How can I treat it?  A: Tension-type headaches occur randomly. They are often the result of temporary stress, anxiety, fatigue, or anger. Symptoms include soreness in your temples, a tightening band-like sensation around your head (a \"vice-like\" ache). Symptoms can also include a pulling feeling, pressure sensations, and patricio head and neck muscles. The headache begins in your forehead, temples, or the back of your head and neck. Treatment for tension-type " "headache may include over-the-counter or prescription medications. Treatment may also include self-help techniques such as relaxation training and biofeedback.  CLUSTER HEADACHES  Q: What is a cluster headache? What causes it? How can I treat it?  A: Cluster headache gets its name because the attacks come in groups. The pain arrives with little, if any, warning. It is usually on one side of the head. A tearing or bloodshot eye and a runny nose on the same side of the headache may also accompany the pain. Cluster headaches are believed to be caused by chemical reactions in the brain. They have been described as the most severe and intense of any headache type. Treatment for cluster headache includes prescription medication and oxygen.  SINUS HEADACHES  Q: What is a sinus headache? What causes it? How can I treat it?  A: When a cavity in the bones of the face and skull (a sinus) becomes inflamed, the inflammation will cause localized pain. This condition is usually the result of an allergic reaction, a tumor, or an infection. If your headache is caused by a sinus blockage, such as an infection, you will probably have a fever. An x-ray will confirm a sinus blockage. Your caregiver's treatment might include antibiotics for the infection, as well as antihistamines or decongestants.   REBOUND HEADACHES  Q: What is a rebound headache? What causes it? How can I treat it?  A: A pattern of taking acute headache medications too often can lead to a condition known as \"rebound headache.\" A pattern of taking too much headache medication includes taking it more than 2 days per week or in excessive amounts. That means more than the label or a caregiver advises. With rebound headaches, your medications not only stop relieving pain, they actually begin to cause headaches. Doctors treat rebound headache by tapering the medication that is being overused. Sometimes your caregiver will gradually substitute a different type of treatment " or medication. Stopping may be a challenge. Regularly overusing a medication increases the potential for serious side effects. Consult a caregiver if you regularly use headache medications more than 2 days per week or more than the label advises.  ADDITIONAL QUESTIONS AND ANSWERS  Q: What is biofeedback?  A: Biofeedback is a self-help treatment. Biofeedback uses special equipment to monitor your body's involuntary physical responses. Biofeedback monitors:  · Breathing.   · Pulse.   · Heart rate.   · Temperature.   · Muscle tension.   · Brain activity.   Biofeedback helps you refine and perfect your relaxation exercises. You learn to control the physical responses that are related to stress. Once the technique has been mastered, you do not need the equipment any more.  Q: Are headaches hereditary?  A: Four out of five (80%) of people that suffer report a family history of migraine. Scientists are not sure if this is genetic or a family predisposition. Despite the uncertainty, a child has a 50% chance of having migraine if one parent suffers. The child has a 75% chance if both parents suffer.   Q: Can children get headaches?  A: By the time they reach high school, most young people have experienced some type of headache. Many safe and effective approaches or medications can prevent a headache from occurring or stop it after it has begun.   Q: What type of doctor should I see to diagnose and treat my headache?  A: Start with your primary caregiver. Discuss his or her experience and approach to headaches. Discuss methods of classification, diagnosis, and treatment. Your caregiver may decide to recommend you to a headache specialist, depending upon your symptoms or other physical conditions. Having diabetes, allergies, etc., may require a more comprehensive and inclusive approach to your headache. The National Headache Foundation will provide, upon request, a list of NHF physician members in your state.  Document  Released: 03/09/2005 Document Revised: 03/11/2013 Document Reviewed: 08/17/2009  [employer]® Patient Information ©2013 [employer], [employer].

## 2020-01-09 NOTE — ED TRIAGE NOTES
"Chief Complaint   Patient presents with   • Weakness     left arm and neck. pt also states bilateral legs \"because of my chronic back pain\" pt ambulatory.    • Headache     left frontal pain. started yesterday        "

## 2020-01-09 NOTE — DISCHARGE PLANNING
Met with pt at bedside using iPad  (#[id number]).     Pt was on his way from [place] to [place] when he ran out of money. He has a 5 yr old daughter in [place] but does not want to live there and wants to go to [place] where he has friends. He states all his past surgeries were done in [place], [place].     He was seen here 1/7/20 and given prescription for Lyrica. Pt states he did not fill it as he had Rx in coat pocket and now does not know where his coat is, he has stayed at the shelter. He came back to ER because the cold hurts his legs.     Pt was provided a taxi voucher to [facility] bus station where he was instructed to speak with police regarding a bus ticket to [place]. Dr Cormier will write prescription for Hydrocodone. Pt states he has Medicare with prescription benefits and will fill Rx in the [facility] area. He was informed that the taxi voucher is a one time courtsey.

## 2020-01-09 NOTE — ED PROVIDER NOTES
"ED Provider Note    Scribed for Sivakumar Welch M.D. by Jennifer Vasquez. 1/9/2020, 8:41 AM.    Primary care provider: Pcp Pt States None  Means of arrival: Walk in  History obtained from: patient   History limited by: none     CHIEF COMPLAINT  Chief Complaint   Patient presents with   • Weakness     left arm and neck. pt also states bilateral legs \"because of my chronic back pain\" pt ambulatory. equal bilateral . no s/s of stroke    • Headache     left frontal pain. started yesterday        HPI  Miguel Cain is a 54 y.o. male who presents to the Emergency Department with complaint of headache with onset one day ago. He states that he has been having a frontal headache for the last day, and has also had left arm, neck, and bilateral leg pain. He was seen here two days ago to address his lower back pain, MR showed no acute neurosurgical emergency, and discharged with hydrocodone. The patient has not been able to fill that prescription since discharge. Denies chest pain, flank pain, fever, chills, nausea or vomiting. He is visiting from [place] but is unable to get home for financial reasons.     Patient has history of diabetes and was been in [place] for four days.  REVIEW OF SYSTEMS  Pertinent positives include headache, left arm pain, neck pain, and bilateral leg pain. Pertinent negatives include no chest pain, flank pain, fever, chills, nausea or vomiting. As above, all other systems reviewed and are negative.   See HPI for further details.     PAST MEDICAL HISTORY   has a past medical history of Chronic back pain, HTN (hypertension), Neuropathy (HCC), and Peptic ulcer.    SURGICAL HISTORY  patient denies any surgical history    SOCIAL HISTORY  Social History     Tobacco Use   • Smoking status: Never Smoker   • Smokeless tobacco: Never Used   Substance Use Topics   • Alcohol use: Never     Frequency: Never   • Drug use: Never      Social History     Substance and Sexual Activity   Drug Use Never " "      FAMILY HISTORY  None noted     CURRENT MEDICATIONS  No current facility-administered medications for this encounter.     Current Outpatient Medications:   •  HYDROcodone-acetaminophen, 1-2 Tab, Oral, Q4HRS PRN, 1/6/2020 at Unknown time  •  famotidine, 20 mg, Oral, BID, 1/6/2020 at Unknown time  •  pregabalin, 25 mg, Oral, TID      ALLERGIES  Allergies   Allergen Reactions   • Ibuprofen    • Tylenol        PHYSICAL EXAM  VITAL SIGNS: BP (!) 163/96   Pulse 92   Temp 36 °C (96.8 °F) (Temporal)   Resp 16   Ht 1.651 m (5' 5\")   Wt 85.5 kg (188 lb 7.9 oz)   SpO2 100%   BMI 31.37 kg/m²   Vitals reviewed.  Constitutional: Alert in no apparent distress.  HENT: No signs of trauma, Bilateral external ears normal, Nose normal.   Eyes: Pupils are equal and reactive, Conjunctiva normal, Non-icteric.   Neck: Normal range of motion, No tenderness, Supple, No stridor.   Lymphatic: No lymphadenopathy noted.   Cardiovascular: Regular rate and rhythm, no murmurs.   Thorax & Lungs: Normal breath sounds, No respiratory distress, No wheezing, No chest tenderness.   Abdomen: Bowel sounds normal, Soft, No tenderness, No peritoneal signs, No masses, No pulsatile masses.   Skin: Warm, Dry, No erythema, No rash.   Back: No bony tenderness, No CVA tenderness.   Extremities: Intact distal pulses, No edema, No tenderness, No cyanosis  Musculoskeletal: Good range of motion in all major joints. No tenderness to palpation or major deformities noted.   Neurologic: Alert , Normal motor function, Normal sensory function, No focal deficits noted.   Psychiatric: Affect normal, Judgment normal, Mood normal.     DIAGNOSTIC STUDIES / PROCEDURES      RADIOLOGY  CT-HEAD W/O   Final Result      1. No CT evidence of acute infarct, hemorrhage or mass.   2. Mild global parenchymal atrophy. Chronic small vessel ischemic changes.        The radiologist's interpretation of all radiological studies have been reviewed by me.    COURSE & MEDICAL DECISION " MAKING  Nursing notes, VS, PMSFHx reviewed in chart.  Differential diagnoses include but not limited to: intracranial mass, headache of unknown cause    Obtained and reviewed past medical records from 1/7/20 which indicated normal labs and MRI Lspine no evidence of spinal cord compression.     8:41 AM Patient seen and examined at bedside. Ordered for CT head to evaluate. Patient will be treated with hydrocodone 325mg for his symptoms.      9:24AM-Discussed with case management the patient's current issue with returning home to [place], case management will see the patient in the emergency department.     10:22AM-The patient will be discharged in stable condition after speaking with case management and is feeling improved after treatment with hydrocodone. He is agreeable to discharge at this time with a prescription for hydrocodone to fill.    I reviewed prescription monitoring program for patient's narcotic use before prescribing a scheduled drug.The patient will not drink alcohol nor drive with prescribed medications} The patient will return for new or worsening symptoms and is stable at the time of discharge.    The patient is referred to a primary physician for blood pressure management, diabetic screening, and for all other preventative health concerns.    In prescribing controlled substances to this patient, I certify that I have obtained and reviewed the medical history of Miguel Cain. I have also made a good franchesca effort to obtain applicable records from other providers who have treated the patient and records did not demonstrate any increased risk of substance abuse that would prevent me from prescribing controlled substances.     I have conducted a physical exam and documented it. I have reviewed Mr. Cain’s prescription history as maintained by the [place] Prescription Monitoring Program.     I have assessed the patient’s risk for abuse, dependency, and addiction using the validated Opioid Risk Tool  available at https://www.mdcalc.com/gwjuzh-oiss-twec-ort-narcotic-abuse.     Given the above, I believe the benefits of controlled substance therapy outweigh the risks. The reasons for prescribing controlled substances include non-narcotic, oral analgesic alternatives have been inadequate for pain control. Accordingly, I have discussed the risk and benefits, treatment plan, and alternative therapies with the patient.         DISPOSITION:  Patient will be discharged home in stable condition.    FOLLOW UP:  [facility], Emergency Dept  [address] [id number] [phone]    If symptoms worsen    [address] [address]  [phone]    call for appointment to establish a primary care doctor if you are staying in [place]      OUTPATIENT MEDICATIONS:  New Prescriptions    No medications on file         The patient will not drink alcohol nor drive with prescribed medications. The patient will return for worsening symptoms and is stable at the time of discharge. The patient verbalizes understanding and will comply.    FINAL IMPRESSION  1. Chronic bilateral low back pain without sciatica    2. Nonintractable headache, unspecified chronicity pattern, unspecified headache type           The note accurately reflects work and decisions made by me.  Sivakumar Welch  1/9/2020  10:37 AM     E

## 2020-01-13 ENCOUNTER — HOSPITAL ENCOUNTER (EMERGENCY)
Dept: HOSPITAL 8 - ED | Age: 55
LOS: 1 days | Discharge: HOME | End: 2020-01-14
Payer: SELF-PAY

## 2020-01-13 VITALS — BODY MASS INDEX: 32.32 KG/M2 | HEIGHT: 65 IN | WEIGHT: 194.01 LBS

## 2020-01-13 DIAGNOSIS — M19.90: Primary | ICD-10-CM

## 2020-01-13 DIAGNOSIS — M54.5: ICD-10-CM

## 2020-01-13 PROCEDURE — 72190 X-RAY EXAM OF PELVIS: CPT

## 2020-01-13 PROCEDURE — 96372 THER/PROPH/DIAG INJ SC/IM: CPT

## 2020-01-13 PROCEDURE — 72131 CT LUMBAR SPINE W/O DYE: CPT

## 2020-01-13 PROCEDURE — 99284 EMERGENCY DEPT VISIT MOD MDM: CPT

## 2020-01-13 PROCEDURE — 72110 X-RAY EXAM L-2 SPINE 4/>VWS: CPT

## 2020-01-14 VITALS — DIASTOLIC BLOOD PRESSURE: 79 MMHG | SYSTOLIC BLOOD PRESSURE: 145 MMHG

## 2020-01-14 PROCEDURE — 99283 EMERGENCY DEPT VISIT LOW MDM: CPT

## 2020-01-14 NOTE — NUR
upon discharge pt started to call this rn bad names in Urdu and wanted 
doctor back in room when the doctor just left room, security called to escort 
pt out of room

## 2020-01-14 NOTE — NUR
PT RESPORTS BACK PAIN STARTING YESTERDAY, PER TRIAGE PT FELL TODAY. DENIES LOC, 
DENIES TRAUMA. PT REPORTS NO OTHER C/O AT THIS TIME. PT CONNECTED TO 
MONITORING, CALL LIGHT WITHIN REACH, ALL SAFETY MEASURES IN PLACE.

## 2020-01-15 ENCOUNTER — HOSPITAL ENCOUNTER (EMERGENCY)
Facility: MEDICAL CENTER | Age: 55
End: 2020-01-15
Attending: EMERGENCY MEDICINE
Payer: MEDICARE

## 2020-01-15 VITALS
DIASTOLIC BLOOD PRESSURE: 85 MMHG | HEIGHT: 78 IN | HEART RATE: 100 BPM | RESPIRATION RATE: 16 BRPM | TEMPERATURE: 97.1 F | SYSTOLIC BLOOD PRESSURE: 154 MMHG | WEIGHT: 196.87 LBS | OXYGEN SATURATION: 97 % | BODY MASS INDEX: 22.78 KG/M2

## 2020-01-15 DIAGNOSIS — S39.012A STRAIN OF LUMBAR REGION, INITIAL ENCOUNTER: ICD-10-CM

## 2020-01-15 PROCEDURE — 99283 EMERGENCY DEPT VISIT LOW MDM: CPT

## 2020-01-15 RX ORDER — CYCLOBENZAPRINE HCL 10 MG
10 TABLET ORAL 3 TIMES DAILY PRN
Qty: 10 TAB | Refills: 0 | Status: SHIPPED
Start: 2020-01-15

## 2020-01-15 ASSESSMENT — LIFESTYLE VARIABLES
TOTAL SCORE: 0
TOTAL SCORE: 0
EVER HAD A DRINK FIRST THING IN THE MORNING TO STEADY YOUR NERVES TO GET RID OF A HANGOVER: NO
CONSUMPTION TOTAL: INCOMPLETE
EVER FELT BAD OR GUILTY ABOUT YOUR DRINKING: NO
TOTAL SCORE: 0
HAVE PEOPLE ANNOYED YOU BY CRITICIZING YOUR DRINKING: NO
DO YOU DRINK ALCOHOL: NO
HAVE YOU EVER FELT YOU SHOULD CUT DOWN ON YOUR DRINKING: NO

## 2020-01-15 NOTE — ED NOTES
" #[id number] Patient upset with discharge planning and was expecting \" a shot\" Per ERP only prescription for flexeril. Patient encouraged to see his PCP or clinic for follow up. Patient educated on dangers of extended narcotic use. Security at door to ensure patient leaves department safely. Patient refused to sign DC papers   "

## 2020-01-15 NOTE — ED NOTES
Used  to speak to patient, patient report hallucinating that someone is coming after him, denies SI/Hi. Patient original complain is of lower back pain and needing medications to help with pain.

## 2020-01-15 NOTE — ED TRIAGE NOTES
".  Chief Complaint   Patient presents with   • Back Pain     Seen here yesterday for same complaint. CHronic back pain for 15 years.       Pt ambulate to triage with above complaint. German interpretor #[id number] used to complete triage interview.   Pt appears to be talking to someone, looks up to the right and speaks Telugu. When questioned about behavior Pt states \"I sometimes hear things but think it is just my nerves.\" Pt denies SI/HI and will not confirm auditory hallucinations.   Pt C/O chronic back pain, is fidgety in chair and reaching down to adjust socks several times. Pt reports he was seen here yesterday but was not given any pain medication, states \"I have got to have something.\"   Pt educated on triage process and returned to [facility].    "

## 2020-01-15 NOTE — ED PROVIDER NOTES
ED Provider Note    CHIEF COMPLAINT  Chief Complaint   Patient presents with   • Back Pain     Seen here yesterday for same complaint. Chronic back pain for 15 years.        HPI  Miguel Cain is a 54 y.o. male here for evaluation of chronic low back pain.  The pt states he has this same low back pain, for over 15 years. He has no fever, no chills, no vomiting, and no change in the pain. He states that the pain is worse with movement and twisting, and improves with rest. He has not taken anything recently for the same.  He was seen yesterday for the same, and states 'nobody helped me.'  He has no incontinence to bowel/bladder, no urinary retention, no rectal numbness.       ROS  See HPI for further details, o/w negative.     PAST MEDICAL HISTORY   has a past medical history of Chronic back pain, HTN (hypertension), Neuropathy (HCC), and Peptic ulcer.    SOCIAL HISTORY  Social History     Tobacco Use   • Smoking status: Never Smoker   • Smokeless tobacco: Never Used   Substance and Sexual Activity   • Alcohol use: Never     Frequency: Never   • Drug use: Never   • Sexual activity: Not on file       Family History  No bleeding disorders     SURGICAL HISTORY  patient denies any surgical history    CURRENT MEDICATIONS  Home Medications     Reviewed by Ml Paz R.N. (Registered Nurse) on 01/14/20 at 2357  Med List Status: Complete   Medication Last Dose Status   famotidine (PEPCID) 20 MG Tab  Active   pregabalin (LYRICA) 25 MG Cap  Active                ALLERGIES  Allergies   Allergen Reactions   • Ibuprofen    • Tylenol        REVIEW OF SYSTEMS  See HPI for further details. Review of systems as above, otherwise all other systems are negative.     PHYSICAL EXAM  Constitutional: Well developed, well nourished. No acute distress.  HEENT: Normocephalic, atraumatic. Posterior pharynx clear and moist.  Eyes:  EOMI. Normal sclera.  Neck: Supple, Full range of motion, nontender.  Chest/Pulmonary: clear to  ausculation. Symmetrical expansion.   Cardio: Regular rate and rhythm with no murmur.   Abdomen: Soft, nontender. No peritoneal signs. No guarding. No palpable masses.  Back: No CVA tenderness, nontender midline, no step offs.  Point tenderness at the right sided psis.  Non tender left psis.   Musculoskeletal: No deformity, no edema, neurovascular intact.   Neuro: Clear speech, appropriate, cooperative, cranial nerves II-XII grossly intact.  Psych: Normal mood and affect    PROCEDURES     MEDICAL RECORD  I have reviewed patient's medical record and pertinent results are listed.    COURSE & MEDICAL DECISION MAKING  I have reviewed any medical record information, laboratory studies and radiographic results as noted above.    1:41 AM  The pt has point tenderness at the psis.  He has no midline back pain, no fever, and denies iv drug use. He has no change in his back pain over the last 15 years, and denies any recent fall or trauma. He has no dysuria, urgency/frequency.  No saddle anesthesia, no incontinence to bowel/bladder. No urinary retention.  At this time, he will be discharged home for follow up.    If you have had any blood pressure issues while here in the emergency department, please see your doctor for a further evaluation or work up.    Differential diagnoses include but not limited to: cauda equina, low back strain, chronic pain, epidural abscess.    This patient presents with low back pain, chronic.  At this time, I have counseled the patient/family regarding their medications, pain control, and follow up.  They will continue their medications, if any, as prescribed.  They will return immediately for any worsening symptoms and/or any other medical concerns.  They will see their doctor, or contact the doctor provided, in 1-2 days for follow up.      FINAL IMPRESSION  Chronic low back pain        Electronically signed by: Nehemias Moeller D.O., 1/15/2020 1:16 AM

## 2020-01-16 ENCOUNTER — HOSPITAL ENCOUNTER (EMERGENCY)
Dept: HOSPITAL 8 - ED | Age: 55
Discharge: HOME | End: 2020-01-16
Payer: COMMERCIAL

## 2020-01-16 VITALS — SYSTOLIC BLOOD PRESSURE: 157 MMHG | DIASTOLIC BLOOD PRESSURE: 90 MMHG

## 2020-01-16 VITALS — HEIGHT: 65 IN | BODY MASS INDEX: 32.58 KG/M2 | WEIGHT: 195.55 LBS

## 2020-01-16 DIAGNOSIS — M79.651: ICD-10-CM

## 2020-01-16 DIAGNOSIS — M25.551: ICD-10-CM

## 2020-01-16 DIAGNOSIS — M79.652: ICD-10-CM

## 2020-01-16 DIAGNOSIS — G89.29: Primary | ICD-10-CM

## 2020-01-16 PROCEDURE — 99283 EMERGENCY DEPT VISIT LOW MDM: CPT

## 2020-01-16 PROCEDURE — 96372 THER/PROPH/DIAG INJ SC/IM: CPT

## 2020-01-16 NOTE — NUR
Patient presents to ER c/o hip and leg pain. Patient states this is chronic 
pain but he is from [place] and has not had pain management for five months. Today 
the pain is worse. 

Patient is in NAD. Patient ambulates with a steady gait. Respirations even and 
unlabored.

## 2020-01-16 NOTE — NUR
BEDSIDE REPORT FROM [facility]ARE, PT RESTING IN [facility]. TO GET MEDICATION AND 
BE DISCHARGED. PT MEDICATED PER MAR.